# Patient Record
Sex: FEMALE | Race: WHITE | Employment: STUDENT | URBAN - METROPOLITAN AREA
[De-identification: names, ages, dates, MRNs, and addresses within clinical notes are randomized per-mention and may not be internally consistent; named-entity substitution may affect disease eponyms.]

---
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
## 2021-03-20 ENCOUNTER — APPOINTMENT (OUTPATIENT)
Dept: GENERAL RADIOLOGY | Age: 21
End: 2021-03-20
Attending: EMERGENCY MEDICINE
Payer: COMMERCIAL

## 2021-03-20 ENCOUNTER — HOSPITAL ENCOUNTER (EMERGENCY)
Age: 21
Discharge: HOME | End: 2021-03-20
Attending: EMERGENCY MEDICINE
Payer: COMMERCIAL

## 2021-03-20 VITALS
OXYGEN SATURATION: 100 % | HEIGHT: 64 IN | BODY MASS INDEX: 22.2 KG/M2 | TEMPERATURE: 98 F | HEART RATE: 98 BPM | SYSTOLIC BLOOD PRESSURE: 127 MMHG | DIASTOLIC BLOOD PRESSURE: 73 MMHG | WEIGHT: 130 LBS | RESPIRATION RATE: 20 BRPM

## 2021-03-20 DIAGNOSIS — S96.911A STRAIN OF RIGHT FOOT, INITIAL ENCOUNTER: Primary | ICD-10-CM

## 2021-03-20 PROCEDURE — 99283 EMERGENCY DEPT VISIT LOW MDM: CPT

## 2021-03-20 PROCEDURE — 73630 X-RAY EXAM OF FOOT: CPT | Performed by: EMERGENCY MEDICINE

## 2021-03-20 RX ORDER — IBUPROFEN 600 MG/1
600 TABLET ORAL ONCE
Status: COMPLETED | OUTPATIENT
Start: 2021-03-20 | End: 2021-03-20

## 2021-03-20 NOTE — ED INITIAL ASSESSMENT (HPI)
Patient presents with complaint of right ankle pain. States her right foot inverted on Thursday and has since then been experiencing right ankle pain.

## 2021-03-20 NOTE — ED PROVIDER NOTES
Patient Seen in: THE [facility] Emergency Department In [place]      History   Patient presents with:  Leg or Foot Injury    Stated Complaint: RIGHT ANKLE PAIN SINCE [address] [facility] \"JUST GAVE OUT\"     HPI/Subjective:   HPI    28-year-old female stated that ED Course   Labs Reviewed - No data to display       Ibuprofen was administered for pain.       XR FOOT, COMPLETE (MIN 3 VIEWS), RIGHT (CPT=73630)    Result Date: 3/20/2021  PROCEDURE:  XR FOOT, COMPLETE (MIN 3 VIEWS), RIGHT (CPT=73630)  TECHNIQUE:

## 2021-06-21 ENCOUNTER — OFFICE VISIT (OUTPATIENT)
Dept: FAMILY MEDICINE CLINIC | Facility: CLINIC | Age: 21
End: 2021-06-21
Payer: COMMERCIAL

## 2021-06-21 VITALS
RESPIRATION RATE: 18 BRPM | TEMPERATURE: 98 F | OXYGEN SATURATION: 100 % | DIASTOLIC BLOOD PRESSURE: 70 MMHG | WEIGHT: 151 LBS | BODY MASS INDEX: 26 KG/M2 | HEART RATE: 92 BPM | SYSTOLIC BLOOD PRESSURE: 112 MMHG

## 2021-06-21 DIAGNOSIS — H60.331 ACUTE SWIMMER'S EAR OF RIGHT SIDE: Primary | ICD-10-CM

## 2021-06-21 PROCEDURE — 99202 OFFICE O/P NEW SF 15 MIN: CPT | Performed by: NURSE PRACTITIONER

## 2021-06-21 PROCEDURE — 3078F DIAST BP <80 MM HG: CPT | Performed by: NURSE PRACTITIONER

## 2021-06-21 PROCEDURE — 3074F SYST BP LT 130 MM HG: CPT | Performed by: NURSE PRACTITIONER

## 2021-06-21 NOTE — PROGRESS NOTES
CHIEF COMPLAINT:   Patient presents with:  Ear Pain      HPI:   Rashaun Gonzalez is a 22year old female who presents to clinic today with complaints of right ear pain. Has had for 12  hours. Pain is described as tender.   Patient denies history of ear i Resp 18   Wt 151 lb (68.5 kg)   LMP 05/20/2021   SpO2 100%   BMI 25.92 kg/m²   GENERAL: well developed, well nourished, and in no apparent distress  SKIN: no rashes, no suspicious lesions  HEAD: atraumatic, normocephalic  EYES: conjunctiva clear, EOM int or bathing). It can also occur after cleaning too deeply in the ear canal with a cotton swab or other object. Sometimes, hair care products get into the ear canal and cause this problem.    Symptoms can include pain, fever, itching, redness, drainage, or sw Fever of 100.4ºF (38ºC) or higher, or as directed by your healthcare provider  Call [phone] or get immediate medical care if any of the following occur:   · Seizure  · Unusual drowsiness or confusion  · Unusual painful or stiff neck    [employer] last

## 2021-06-22 ENCOUNTER — OFFICE VISIT (OUTPATIENT)
Dept: FAMILY MEDICINE CLINIC | Facility: CLINIC | Age: 21
End: 2021-06-22
Payer: COMMERCIAL

## 2021-06-22 VITALS
OXYGEN SATURATION: 95 % | WEIGHT: 135 LBS | HEART RATE: 104 BPM | BODY MASS INDEX: 23.05 KG/M2 | DIASTOLIC BLOOD PRESSURE: 97 MMHG | HEIGHT: 64 IN | SYSTOLIC BLOOD PRESSURE: 148 MMHG | RESPIRATION RATE: 20 BRPM | TEMPERATURE: 100 F

## 2021-06-22 DIAGNOSIS — H92.01 EAR PAIN, RIGHT: ICD-10-CM

## 2021-06-22 DIAGNOSIS — J02.9 PHARYNGITIS, UNSPECIFIED ETIOLOGY: Primary | ICD-10-CM

## 2021-06-22 DIAGNOSIS — J02.0 STREP THROAT: ICD-10-CM

## 2021-06-22 PROCEDURE — 3008F BODY MASS INDEX DOCD: CPT | Performed by: NURSE PRACTITIONER

## 2021-06-22 PROCEDURE — 99213 OFFICE O/P EST LOW 20 MIN: CPT | Performed by: NURSE PRACTITIONER

## 2021-06-22 PROCEDURE — 87880 STREP A ASSAY W/OPTIC: CPT | Performed by: NURSE PRACTITIONER

## 2021-06-22 PROCEDURE — 3077F SYST BP >= 140 MM HG: CPT | Performed by: NURSE PRACTITIONER

## 2021-06-22 PROCEDURE — 3080F DIAST BP >= 90 MM HG: CPT | Performed by: NURSE PRACTITIONER

## 2021-06-22 RX ORDER — PENICILLIN V POTASSIUM 500 MG/1
500 TABLET ORAL 2 TIMES DAILY
Qty: 20 TABLET | Refills: 0 | Status: SHIPPED | OUTPATIENT
Start: 2021-06-22 | End: 2021-07-02

## 2021-06-22 RX ORDER — MINOCYCLINE HYDROCHLORIDE 50 MG/1
50 CAPSULE ORAL
COMMUNITY
Start: 2021-06-16

## 2021-06-22 RX ORDER — CLINDAMYCIN PHOSPHATE 10 MG/ML
LOTION TOPICAL
COMMUNITY
Start: 2021-06-16

## 2021-06-22 RX ORDER — TRETINOIN 0.025 %
CREAM (GRAM) TOPICAL
COMMUNITY
Start: 2021-06-16

## 2021-06-22 NOTE — PATIENT INSTRUCTIONS
Increase oral fluids. Tylenol  for fever or pain. Take antibiotic as directed. Antibiotic may take 2-3 days before you see any difference in the way you feel.  Finish the antibiotic even if you are feeling better    Follow-up with primary care provider in come back after treatment is done (acute recurrent tonsillitis).  Symptoms include:  · Fever  · Sore throat  · Bad breath  · Trouble swallowing  · Fluid loss (dehydration)  · Sore lymph nodes in the neck  · Tiredness  · Snoring, sleep apnea, or breathing th treated with antibiotics. This kind of infection often goes away on its own. Home care may be all that you need, with rest and fluids.  Follow these tips to help ease your symptoms at home:  · Get plenty of rest.  · Drink lots of fluids, such as soup, broth directed by your provider  · A lump that gets larger  · Worsening throat pain or neck pain  · Unable to open your mouth fully (called lockjaw or trismus)  · Neck stiffness  · Bleeding  · Painful swallowing  · Feeling very ill or sick  · Sore throat for mor

## 2021-06-22 NOTE — PROGRESS NOTES
HPI/Subjective:   Patient ID: Chadwick Butler is a 22year old female. Seen here yesterday for ear clogging and pain right ear into jaw    Ear Pain   There is pain in the right ear. This is a new problem. The current episode started in the past 7 days. capsule (100 mg total) by mouth 2 (two) times daily. 60 capsule 2   • ibuprofen (MOTRIN) 200 MG Oral Tab Take 200 mg by mouth every 6 (six) hours as needed for Pain. • Fexofenadine-Pseudoephedrine (ALLEGRA-D OR) Take  by mouth.      • traMADol HCl (ULTR motion and neck supple. Lymphadenopathy:      Cervical: Cervical adenopathy present. Skin:     General: Skin is warm and dry. Neurological:      Mental Status: She is alert and oriented to person, place, and time.       Cranial Nerves: No cranial nerv

## 2021-08-04 ENCOUNTER — OFFICE VISIT (OUTPATIENT)
Dept: FAMILY MEDICINE CLINIC | Facility: CLINIC | Age: 21
End: 2021-08-04
Payer: COMMERCIAL

## 2021-08-04 VITALS
DIASTOLIC BLOOD PRESSURE: 63 MMHG | TEMPERATURE: 99 F | RESPIRATION RATE: 18 BRPM | WEIGHT: 152 LBS | OXYGEN SATURATION: 99 % | BODY MASS INDEX: 26 KG/M2 | SYSTOLIC BLOOD PRESSURE: 123 MMHG | HEART RATE: 123 BPM

## 2021-08-04 DIAGNOSIS — J02.9 ACUTE PHARYNGITIS, UNSPECIFIED ETIOLOGY: Primary | ICD-10-CM

## 2021-08-04 DIAGNOSIS — R00.0 TACHYCARDIA WITH HEART RATE 121-140 BEATS PER MINUTE: ICD-10-CM

## 2021-08-04 DIAGNOSIS — H69.81 DYSFUNCTION OF RIGHT EUSTACHIAN TUBE: ICD-10-CM

## 2021-08-04 PROCEDURE — 87880 STREP A ASSAY W/OPTIC: CPT | Performed by: NURSE PRACTITIONER

## 2021-08-04 PROCEDURE — 3074F SYST BP LT 130 MM HG: CPT | Performed by: NURSE PRACTITIONER

## 2021-08-04 PROCEDURE — 99213 OFFICE O/P EST LOW 20 MIN: CPT | Performed by: NURSE PRACTITIONER

## 2021-08-04 PROCEDURE — 3078F DIAST BP <80 MM HG: CPT | Performed by: NURSE PRACTITIONER

## 2021-08-04 RX ORDER — FLUTICASONE PROPIONATE 50 MCG
2 SPRAY, SUSPENSION (ML) NASAL DAILY
Qty: 1 EACH | Refills: 0 | Status: SHIPPED | OUTPATIENT
Start: 2021-08-04 | End: 2022-07-30

## 2021-08-04 RX ORDER — AMOXICILLIN 875 MG/1
875 TABLET, COATED ORAL 2 TIMES DAILY
Qty: 20 TABLET | Refills: 0 | Status: SHIPPED | OUTPATIENT
Start: 2021-08-04 | End: 2021-08-14

## 2021-08-04 NOTE — PROGRESS NOTES
CHIEF COMPLAINT:   Patient presents with:  Sore Throat      HPI:   Virgilio Wong is a 22year old female presents to clinic with symptoms of sore throat. Patient has had for 2 days. Symptoms have stable since onset.   Patient reports following Magdalena Morales Tobacco Use      Smoking status: Never Smoker      Smokeless tobacco: Never Used    Vaping Use      Vaping Use: Never used    Alcohol use: No    Drug use: No       REVIEW OF SYSTEMS:   GENERAL HEALTH:  See HPI  SKIN: denies any unusual skin lesions or rash per day for at least 3 days. Do not share utensils or drinks with anyone. Follow up in 3-5 days if not improving, condition worsens, or fever greater than or equal to 100.4 persists for 72 hours.       Patient Instructions     Earache, No Infection (Edilberto fever, or drainage from the ear. Home care  These home-care tips will help you take care of yourself:  · You may use over-the-counter medicine as directed by your healthcare provider to control pain, unless medicine was prescribed.  If you have chronic silvana follow up with their PCP prn.

## 2021-08-04 NOTE — PATIENT INSTRUCTIONS
Earache, No Infection (Adult)   Earaches can happen without an infection. They can occur when air and fluid build up behind the eardrum. They may cause a feeling of fullness and discomfort. They may also impair hearing.  This is called otitis media with e prescribed. If you have chronic liver or kidney disease or ever had a stomach ulcer or GI bleeding, talk with your healthcare provider before using any medicines. · Aspirin should never be used in anyone younger than age 25 who has a fever.  It may cause s

## 2021-08-07 LAB
CONTROL LINE PRESENT WITH A CLEAR BACKGROUND (YES/NO): YES YES/NO
KIT LOT #: NORMAL NUMERIC
STREP GRP A CUL-SCR: POSITIVE

## 2021-11-18 PROBLEM — R43.0 LOSS OF SMELL: Status: ACTIVE | Noted: 2021-11-18

## 2021-12-14 ENCOUNTER — OFFICE VISIT (OUTPATIENT)
Dept: FAMILY MEDICINE CLINIC | Facility: CLINIC | Age: 21
End: 2021-12-14
Payer: COMMERCIAL

## 2021-12-14 VITALS
TEMPERATURE: 99 F | DIASTOLIC BLOOD PRESSURE: 70 MMHG | SYSTOLIC BLOOD PRESSURE: 126 MMHG | HEIGHT: 64 IN | HEART RATE: 96 BPM | OXYGEN SATURATION: 98 % | BODY MASS INDEX: 24.75 KG/M2 | RESPIRATION RATE: 18 BRPM | WEIGHT: 145 LBS

## 2021-12-14 DIAGNOSIS — J06.9 VIRAL URI: ICD-10-CM

## 2021-12-14 DIAGNOSIS — J02.9 SORE THROAT: Primary | ICD-10-CM

## 2021-12-14 DIAGNOSIS — Z20.822 SUSPECTED COVID-19 VIRUS INFECTION: ICD-10-CM

## 2021-12-14 PROCEDURE — 3074F SYST BP LT 130 MM HG: CPT | Performed by: NURSE PRACTITIONER

## 2021-12-14 PROCEDURE — 87880 STREP A ASSAY W/OPTIC: CPT | Performed by: NURSE PRACTITIONER

## 2021-12-14 PROCEDURE — 3008F BODY MASS INDEX DOCD: CPT | Performed by: NURSE PRACTITIONER

## 2021-12-14 PROCEDURE — 87081 CULTURE SCREEN ONLY: CPT | Performed by: NURSE PRACTITIONER

## 2021-12-14 PROCEDURE — 3078F DIAST BP <80 MM HG: CPT | Performed by: NURSE PRACTITIONER

## 2021-12-14 PROCEDURE — 99213 OFFICE O/P EST LOW 20 MIN: CPT | Performed by: NURSE PRACTITIONER

## 2021-12-14 NOTE — PROGRESS NOTES
CHIEF COMPLAINT:   Patient presents with:  Sore Throat: x 3-4, runny nose no fevers. no cough,       HPI:   Chadwick Butler is a 22year old female who presents for covid-19 testing. Patient reports sore throat, nasal congestion/runny nose x 3-4 days.   Damion Dominguez appetite  SKIN: no rashes or abnormal skin lesions  HEENT: + sore throat, + sinus symptoms, Denies ear pain  LUNGS: Denies cough, denies shortness of breath or wheezing,   CARDIOVASCULAR: denies chest pain or palpitations   GI: denies N/V/C or abdominal pa viral uri symptoms. No signs of pta or retropharyngeal infection. Lungs clear bilat. No respiratory distress noted. We discussed covid-19 vs strep vs other etiologies. Rapid strep test negative. Covid-19 test sent to lab.   Treatment options discussed with results, aftercare, and plasma donation.       Quarantine (for anyone in close contact with someone who has COVID-19)  Anyone who has been in close contact with someone who has COVID-19 should quarantine at home for 14 days from the time of exposure and fol to endorse quarantine for 14 days and recognizes that any quarantine shorter than 14 days balances reduced burden against a small possibility of spreading the virus. 10 Ways to Manage Your Health at Home      1.  Stay home from work, school, and away fro been exposed or are not aware of an exposure to COVID-19 and are concerned about your symptoms, please contact your health care provider with any questions.     Home Isolation  If you have tested positive for COVID-19, you should remain under home isolation plasma. Convalescent plasma is a component of blood that, in people who have recovered from COVID-19, contains antibodies against the virus.  The antibodies in plasma can be used as a treatment for patients in our community who are most severely affected ongoing or new symptoms. Post COVID conditions are a wide range of new, returning, or ongoing health problems. Talk to your provider if you are not feeling well 4 or more weeks after being diagnosed with COVID-19.   Patients with Post-COVID conditions may throat can be painful. There are many reasons why you may have a sore throat. Your healthcare provider will work with you to find the cause of your sore throat. He or she will also find the best treatment for you. What causes a sore throat?   Sore throa to your chest.   Possible tests  Other tests your healthcare provider may perform include:   · A throat swab to check for bacteria such as streptococcus (the bacteria that causes strep throat)  · A blood test to check for mononucleosis (a viral infection) or she thinks they are likely to help. If antibiotics are prescribed  Take the medicine exactly as directed. Be sure to finish your prescription even if you’re feeling better.  Ask your healthcare provider or pharmacist what side effects are common and wh is another term for the common cold. This illness is contagious during the first few days. It is spread through the air by coughing and sneezing.  It may also be spread by direct contact (touching the sick person and then touching your own eyes, nose, or mo provider, or as advised.   When to seek medical advice  Call your healthcare provider right away if any of these occur:  · Cough with lots of colored sputum (mucus)  · Severe headache; face, neck, or ear pain  · Difficulty swallowing due to throat pain  · F

## 2021-12-14 NOTE — PATIENT INSTRUCTIONS
1. Rest. Drink plenty of fluids. 2. Tylenol/Ibuprofen for pain/fevers. 3. Salt water gargles three times daily  4. Use humidifier at home when possible. 5. The rapid strep test was negative today.  We will send a throat culture to lab and call you with sangeetha kissed them)  * You shared eating or drinking utensils  * They sneezed, coughed, or somehow got respiratory droplets on you    Reducing the length of quarantine may make it easier for people to quarantine by reducing the time they cannot work.  A shorter qu personnel that you have or may have COVID-19.   6. Cover your cough and sneezes. 7. Wash your hands often with soap and water for at least 20 seconds or clean your hands with an alcohol-based hand  that contains at least 60% alcohol.    8. As muc passed for severe illness (requiring hospitalization) OR if you are immunocompromised.   If you have a fever with cough or shortness of breath but have not been exposed to someone with COVID-19 and have not tested positive for COVID-19, you should also stay [employer]    Who is eligible to donate convalescent plasma?     Potential convalescent plasma donors must:    · Have had a confirmed diagnosis of COVID-19  · Be symptom-free for at least 14 days*    *Some peo for a Post-COVID condition? It is still too early to say for sure. Currently, we find the people who experience Post-COVID conditions to be random.   Researchers are trying to identify similarities between people with a Post-COVID condition to better unde and pharynx can become inflamed due to a cold or flu virus. Postnasal drip (excess mucus draining from the nasal cavity) can irritate the throat. It can also make the throat or tonsils more likely to be infected by bacteria.  Severe, untreated tonsillitis i may need to be removed. Relieving your symptoms  · Don’t smoke, and stay away from secondhand smoke. · For children, try throat sprays or frozen ice pops. Adults and older children may try lozenges.   · Drink warm liquids to soothe the throat and help th problems during sleep  · Tonsillitis caused by food particles collecting in pouches in the tonsils (cryptic tonsillitis)  When to call your healthcare provider   Call your healthcare provider immediately if any of the following occur:   · Problems swallowi tired.  · Don't smoke. If you need help stopping, talk with your healthcare provider. · Avoid being exposed to cigarette smoke (yours or others’). · You may use acetaminophen or ibuprofen to control pain and fever, unless another medicine was prescribed. This information is not intended as a substitute for professional medical care. Always follow your healthcare professional's instructions.

## 2023-03-20 ENCOUNTER — HOSPITAL ENCOUNTER (OUTPATIENT)
Dept: MRI IMAGING | Age: 23
Discharge: HOME | End: 2023-03-20
Attending: PAIN MEDICINE
Payer: COMMERCIAL

## 2023-03-20 DIAGNOSIS — M25.571 RIGHT ANKLE PAIN: ICD-10-CM

## 2023-03-20 PROCEDURE — 73721 MRI JNT OF LWR EXTRE W/O DYE: CPT | Performed by: PAIN MEDICINE

## 2023-07-13 ENCOUNTER — OFFICE VISIT (OUTPATIENT)
Dept: FAMILY MEDICINE CLINIC | Facility: CLINIC | Age: 23
End: 2023-07-13
Payer: COMMERCIAL

## 2023-07-13 VITALS
HEIGHT: 63.58 IN | RESPIRATION RATE: 16 BRPM | SYSTOLIC BLOOD PRESSURE: 102 MMHG | BODY MASS INDEX: 25.83 KG/M2 | WEIGHT: 147.63 LBS | TEMPERATURE: 99 F | DIASTOLIC BLOOD PRESSURE: 70 MMHG | HEART RATE: 84 BPM

## 2023-07-13 DIAGNOSIS — Z00.00 WELLNESS EXAMINATION: Primary | ICD-10-CM

## 2023-07-13 DIAGNOSIS — R68.2 DRY MOUTH: ICD-10-CM

## 2023-07-13 DIAGNOSIS — F41.9 MILD ANXIETY: ICD-10-CM

## 2023-07-13 DIAGNOSIS — Z23 NEED FOR VACCINATION: ICD-10-CM

## 2023-07-13 PROBLEM — J34.2 NASAL SEPTAL DEVIATION: Status: ACTIVE | Noted: 2023-01-05

## 2023-07-13 PROBLEM — R09.81 NASAL CONGESTION: Status: ACTIVE | Noted: 2023-01-05

## 2023-07-13 PROCEDURE — 90471 IMMUNIZATION ADMIN: CPT | Performed by: STUDENT IN AN ORGANIZED HEALTH CARE EDUCATION/TRAINING PROGRAM

## 2023-07-13 PROCEDURE — 3008F BODY MASS INDEX DOCD: CPT | Performed by: STUDENT IN AN ORGANIZED HEALTH CARE EDUCATION/TRAINING PROGRAM

## 2023-07-13 PROCEDURE — 99385 PREV VISIT NEW AGE 18-39: CPT | Performed by: STUDENT IN AN ORGANIZED HEALTH CARE EDUCATION/TRAINING PROGRAM

## 2023-07-13 PROCEDURE — 3074F SYST BP LT 130 MM HG: CPT | Performed by: STUDENT IN AN ORGANIZED HEALTH CARE EDUCATION/TRAINING PROGRAM

## 2023-07-13 PROCEDURE — 99204 OFFICE O/P NEW MOD 45 MIN: CPT | Performed by: STUDENT IN AN ORGANIZED HEALTH CARE EDUCATION/TRAINING PROGRAM

## 2023-07-13 PROCEDURE — 3078F DIAST BP <80 MM HG: CPT | Performed by: STUDENT IN AN ORGANIZED HEALTH CARE EDUCATION/TRAINING PROGRAM

## 2023-07-13 PROCEDURE — 90715 TDAP VACCINE 7 YRS/> IM: CPT | Performed by: STUDENT IN AN ORGANIZED HEALTH CARE EDUCATION/TRAINING PROGRAM

## 2023-07-13 NOTE — PATIENT INSTRUCTIONS
Ob/gyn  - Dr. Edilia Estes - procedures out of [place]  - Dr. Rayne Thomas and Rhianna - procedures out of [place]

## 2023-08-11 ENCOUNTER — TELEPHONE (OUTPATIENT)
Dept: FAMILY MEDICINE CLINIC | Facility: CLINIC | Age: 23
End: 2023-08-11

## 2023-08-11 ENCOUNTER — LAB ENCOUNTER (OUTPATIENT)
Dept: LAB | Age: 23
End: 2023-08-11
Attending: STUDENT IN AN ORGANIZED HEALTH CARE EDUCATION/TRAINING PROGRAM
Payer: COMMERCIAL

## 2023-08-11 DIAGNOSIS — Z00.00 WELLNESS EXAMINATION: ICD-10-CM

## 2023-08-11 LAB
ALBUMIN SERPL-MCNC: 3.9 G/DL (ref 3.4–5)
ALBUMIN/GLOB SERPL: 1 {RATIO} (ref 1–2)
ALP LIVER SERPL-CCNC: 62 U/L
ALT SERPL-CCNC: 17 U/L
ANION GAP SERPL CALC-SCNC: 3 MMOL/L (ref 0–18)
AST SERPL-CCNC: 8 U/L (ref 15–37)
BASOPHILS # BLD AUTO: 0.04 X10(3) UL (ref 0–0.2)
BASOPHILS NFR BLD AUTO: 0.8 %
BILIRUB SERPL-MCNC: 0.5 MG/DL (ref 0.1–2)
BILIRUB UR QL STRIP.AUTO: NEGATIVE
BUN BLD-MCNC: 11 MG/DL (ref 7–18)
CALCIUM BLD-MCNC: 9.5 MG/DL (ref 8.5–10.1)
CHLORIDE SERPL-SCNC: 106 MMOL/L (ref 98–112)
CHOLEST SERPL-MCNC: 174 MG/DL (ref ?–200)
CO2 SERPL-SCNC: 28 MMOL/L (ref 21–32)
COLOR UR AUTO: YELLOW
CREAT BLD-MCNC: 0.82 MG/DL
EGFRCR SERPLBLD CKD-EPI 2021: 104 ML/MIN/1.73M2 (ref 60–?)
EOSINOPHIL # BLD AUTO: 0.2 X10(3) UL (ref 0–0.7)
EOSINOPHIL NFR BLD AUTO: 3.9 %
ERYTHROCYTE [DISTWIDTH] IN BLOOD BY AUTOMATED COUNT: 12 %
FASTING PATIENT LIPID ANSWER: NO
FASTING STATUS PATIENT QL REPORTED: NO
GLOBULIN PLAS-MCNC: 3.8 G/DL (ref 2.8–4.4)
GLUCOSE BLD-MCNC: 84 MG/DL (ref 70–99)
GLUCOSE UR STRIP.AUTO-MCNC: NEGATIVE MG/DL
HCT VFR BLD AUTO: 42.2 %
HDLC SERPL-MCNC: 76 MG/DL (ref 40–59)
HGB BLD-MCNC: 13.8 G/DL
HYALINE CASTS #/AREA URNS AUTO: PRESENT /LPF
IMM GRANULOCYTES # BLD AUTO: 0.01 X10(3) UL (ref 0–1)
IMM GRANULOCYTES NFR BLD: 0.2 %
KETONES UR STRIP.AUTO-MCNC: NEGATIVE MG/DL
LDLC SERPL CALC-MCNC: 83 MG/DL (ref ?–100)
LYMPHOCYTES # BLD AUTO: 1.98 X10(3) UL (ref 1–4)
LYMPHOCYTES NFR BLD AUTO: 39 %
MCH RBC QN AUTO: 28.4 PG (ref 26–34)
MCHC RBC AUTO-ENTMCNC: 32.7 G/DL (ref 31–37)
MCV RBC AUTO: 86.8 FL
MONOCYTES # BLD AUTO: 0.43 X10(3) UL (ref 0.1–1)
MONOCYTES NFR BLD AUTO: 8.5 %
NEUTROPHILS # BLD AUTO: 2.42 X10 (3) UL (ref 1.5–7.7)
NEUTROPHILS # BLD AUTO: 2.42 X10(3) UL (ref 1.5–7.7)
NEUTROPHILS NFR BLD AUTO: 47.6 %
NITRITE UR QL STRIP.AUTO: NEGATIVE
NONHDLC SERPL-MCNC: 98 MG/DL (ref ?–130)
OSMOLALITY SERPL CALC.SUM OF ELEC: 283 MOSM/KG (ref 275–295)
PH UR STRIP.AUTO: 7 [PH] (ref 5–8)
PLATELET # BLD AUTO: 301 10(3)UL (ref 150–450)
POTASSIUM SERPL-SCNC: 4.2 MMOL/L (ref 3.5–5.1)
PROT SERPL-MCNC: 7.7 G/DL (ref 6.4–8.2)
PROT UR STRIP.AUTO-MCNC: NEGATIVE MG/DL
RBC # BLD AUTO: 4.86 X10(6)UL
RBC UR QL AUTO: NEGATIVE
SODIUM SERPL-SCNC: 137 MMOL/L (ref 136–145)
SP GR UR STRIP.AUTO: 1.02 (ref 1–1.03)
TRIGL SERPL-MCNC: 82 MG/DL (ref 30–149)
TSI SER-ACNC: 1.31 MIU/ML (ref 0.36–3.74)
UROBILINOGEN UR STRIP.AUTO-MCNC: <2 MG/DL
VLDLC SERPL CALC-MCNC: 13 MG/DL (ref 0–30)
WBC # BLD AUTO: 5.1 X10(3) UL (ref 4–11)

## 2023-08-11 PROCEDURE — 85025 COMPLETE CBC W/AUTO DIFF WBC: CPT

## 2023-08-11 PROCEDURE — 80053 COMPREHEN METABOLIC PANEL: CPT

## 2023-08-11 PROCEDURE — 87086 URINE CULTURE/COLONY COUNT: CPT

## 2023-08-11 PROCEDURE — 84443 ASSAY THYROID STIM HORMONE: CPT

## 2023-08-11 PROCEDURE — 80061 LIPID PANEL: CPT

## 2023-08-11 PROCEDURE — 81001 URINALYSIS AUTO W/SCOPE: CPT

## 2023-08-11 NOTE — TELEPHONE ENCOUNTER
Pt dropped off work physical form for Dr. Flavia Zepeda to fill out.  Put on MA desk on 8/11  Pt physical was on 7/13

## 2023-08-14 ENCOUNTER — MED REC SCAN ONLY (OUTPATIENT)
Dept: FAMILY MEDICINE CLINIC | Facility: CLINIC | Age: 23
End: 2023-08-14

## 2023-08-14 NOTE — TELEPHONE ENCOUNTER
I have completed the px form for the pt, along with Dr. Renata Patel, and placed it at the  for her to pick it up. The pt called the office back and was informed of this via RUBI Floyd. A copy of the form was also sent to scanning to be uploaded into the pt's chart.

## 2023-09-07 ENCOUNTER — OFFICE VISIT (OUTPATIENT)
Dept: FAMILY MEDICINE CLINIC | Facility: CLINIC | Age: 23
End: 2023-09-07
Payer: COMMERCIAL

## 2023-09-07 VITALS
DIASTOLIC BLOOD PRESSURE: 72 MMHG | BODY MASS INDEX: 25.61 KG/M2 | TEMPERATURE: 98 F | SYSTOLIC BLOOD PRESSURE: 108 MMHG | OXYGEN SATURATION: 99 % | HEIGHT: 64 IN | RESPIRATION RATE: 16 BRPM | HEART RATE: 85 BPM | WEIGHT: 150 LBS

## 2023-09-07 DIAGNOSIS — J06.9 VIRAL URI: Primary | ICD-10-CM

## 2023-09-07 PROCEDURE — 3008F BODY MASS INDEX DOCD: CPT | Performed by: NURSE PRACTITIONER

## 2023-09-07 PROCEDURE — 99213 OFFICE O/P EST LOW 20 MIN: CPT | Performed by: NURSE PRACTITIONER

## 2023-09-07 PROCEDURE — 3078F DIAST BP <80 MM HG: CPT | Performed by: NURSE PRACTITIONER

## 2023-09-07 PROCEDURE — 3074F SYST BP LT 130 MM HG: CPT | Performed by: NURSE PRACTITIONER

## 2023-09-07 NOTE — PATIENT INSTRUCTIONS
Push fluids and rest  You can take an over the counter decongestant medication and flonase nasal spray if needed. Follow up for any new or worsening symptoms or if symptoms are not improving over the next 5 days.

## 2024-02-12 ENCOUNTER — OFFICE VISIT (OUTPATIENT)
Dept: FAMILY MEDICINE CLINIC | Facility: CLINIC | Age: 24
End: 2024-02-12
Payer: COMMERCIAL

## 2024-02-12 VITALS
HEIGHT: 64 IN | WEIGHT: 147.81 LBS | RESPIRATION RATE: 16 BRPM | BODY MASS INDEX: 25.24 KG/M2 | TEMPERATURE: 99 F | HEART RATE: 88 BPM | OXYGEN SATURATION: 100 % | DIASTOLIC BLOOD PRESSURE: 72 MMHG | SYSTOLIC BLOOD PRESSURE: 121 MMHG

## 2024-02-12 DIAGNOSIS — J06.9 VIRAL URI: Primary | ICD-10-CM

## 2024-02-12 PROCEDURE — 99213 OFFICE O/P EST LOW 20 MIN: CPT | Performed by: NURSE PRACTITIONER

## 2024-02-12 PROCEDURE — 3008F BODY MASS INDEX DOCD: CPT | Performed by: NURSE PRACTITIONER

## 2024-02-12 PROCEDURE — 3074F SYST BP LT 130 MM HG: CPT | Performed by: NURSE PRACTITIONER

## 2024-02-12 PROCEDURE — 3078F DIAST BP <80 MM HG: CPT | Performed by: NURSE PRACTITIONER

## 2024-02-12 NOTE — PROGRESS NOTES
CHIEF COMPLAINT:     Chief Complaint   Patient presents with    Voice Problem     Lost of voice, runny nose, sneezing and body aches s/s for 3 days.  Otc meds take       HPI:   Meryl Nicolas is a 23 year old female who presents for hoarse voice, runny nose, ears popping, sneezing. . Symptoms started 5-6 days ago with hoarse voice. Hoarse voice improved then developed the sinus congestion and ear pressure the past few days.  Treating symptoms with allegra-D.  Patient has not tested for COVID since symptom onset.    Current Outpatient Medications   Medication Sig Dispense Refill    traMADol HCl (ULTRAM) 50 MG Oral Tab Take 1 tablet (50 mg total) by mouth 2 (two) times daily as needed for Pain. For 30 days 60 tablet 2    gabapentin (NEURONTIN) 300 MG Oral Cap Take 1 capsule (300 mg total) by mouth 3 (three) times daily. for 30 days 90 capsule 2    celecoxib (CELEBREX) 100 MG Oral Cap Take 1 capsule (100 mg total) by mouth 2 (two) times daily. 60 capsule 2    ibuprofen (MOTRIN) 200 MG Oral Tab Take 1 tablet (200 mg total) by mouth every 6 (six) hours as needed for Pain.      Fexofenadine-Pseudoephedrine (ALLEGRA-D OR) Take  by mouth.        Past Medical History:   Diagnosis Date    Allergic rhinitis Whole life    Anxiety N/A    Arthritis 2014      No past surgical history on file.      Social History     Socioeconomic History    Marital status: Single   Tobacco Use    Smoking status: Never    Smokeless tobacco: Never   Vaping Use    Vaping Use: Never used   Substance and Sexual Activity    Alcohol use: Yes     Alcohol/week: 4.0 standard drinks of alcohol     Types: 2 Glasses of wine, 2 Cans of beer per week     Comment: 2 drinks a week.    Drug use: No    Sexual activity: Not Currently   Other Topics Concern    Caffeine Concern No    Exercise Yes     Comment: 3 times a week    Seat Belt No    Special Diet No    Stress Concern No    Weight Concern No     Service No    Hobby Hazards No    Sleep Concern No          REVIEW OF SYSTEMS:   GENERAL: normal appetite. No fever.   SKIN: no rashes or abnormal skin lesions  HEENT: See HPI  LUNGS: denies shortness of breath or wheezing, See HPI  CARDIOVASCULAR: denies chest pain or palpitations   GI: denies N/V/C or abdominal pain  NEURO: Denies dizziness or weakness    EXAM:   /72   Pulse 88   Temp 99 °F (37.2 °C) (Oral)   Resp 16   Ht 5' 4\" (1.626 m)   Wt 147 lb 12.8 oz (67 kg)   LMP 01/28/2024 (Approximate)   SpO2 100%   BMI 25.37 kg/m²   GENERAL: well developed, well nourished,in no apparent distress  SKIN: no rashes,no suspicious lesions  HEAD: atraumatic, normocephalic.  no tenderness on palpation of  sinuses  EYES: conjunctiva clear, EOM intact  EARS: TM's gray, no bulging, no retraction,+serous fluid fluid, bony landmarks visible  NOSE: Nostrils patent, no nasal discharge, nasal mucosa pink   THROAT: Oral mucosa pink, moist. Posterior pharynx is non erythematous. no exudates. Tonsils 1/4.    NECK: Supple, non-tender  LUNGS: clear to auscultation bilaterally, no wheezes or rhonchi. Breathing is non labored.  CARDIO: RRR without murmur  EXTREMITIES: no cyanosis, clubbing or edema  LYMPH:  no lymphadenopathy.        ASSESSMENT AND PLAN:   Meryl Nicolas is a 23 year old female who presents with upper respiratory symptoms that are consistent with    ASSESSMENT:   Encounter Diagnosis   Name Primary?    Viral URI Yes         PLAN:     Comfort care per pt instructions.   To follow up for any new or worsening symptoms or if not improving the next few days.       Meds & Refills for this Visit:  Requested Prescriptions      No prescriptions requested or ordered in this encounter       Risks, benefits, and side effects of medication explained and discussed.    Patient Instructions   Flonase nasal spray  May continue allegra-D the next few days  Follow up for new or worsening symptoms or if not improving over the next 4-5 days.     The patient indicates understanding of  these issues and agrees to the plan.  The patient is asked to return if sx's persist or worsen.

## 2024-09-23 ENCOUNTER — PATIENT MESSAGE (OUTPATIENT)
Dept: FAMILY MEDICINE CLINIC | Facility: CLINIC | Age: 24
End: 2024-09-23

## 2024-09-25 NOTE — TELEPHONE ENCOUNTER
From: Meryl Nicolas  To: Tri Rivas  Sent: 9/23/2024 2:37 PM CDT  Subject: Physical for surgery     Hello I am getting a surgery done on November 1st and need to get a physical done but your first available appointment is November 21st. Is there any way I will be able to get an appointment before the surgery?     Thanks   Meryl Nicolas

## 2024-10-03 NOTE — TELEPHONE ENCOUNTER
Vm left asking patient to let us know who the surgeon is and where surgery is being done.  I told her I would reach out to them.

## 2024-10-04 ENCOUNTER — TELEPHONE (OUTPATIENT)
Dept: FAMILY MEDICINE CLINIC | Facility: CLINIC | Age: 24
End: 2024-10-04

## 2024-10-04 DIAGNOSIS — Z01.818 PREOP EXAMINATION: Primary | ICD-10-CM

## 2024-10-04 NOTE — TELEPHONE ENCOUNTER
Pre-Op paperwork received by fax.    Date of Surgery:  11/1/24  Surgeon: Dr. Timothy Rubio  Procedure: Septoplasty      On day of pre-op will provide:  H&P, Clearance, and results for CBC,CMP and EKG  And fax results to: [phone] & [phone]

## 2024-10-24 ENCOUNTER — OFFICE VISIT (OUTPATIENT)
Dept: FAMILY MEDICINE CLINIC | Facility: CLINIC | Age: 24
End: 2024-10-24
Payer: COMMERCIAL

## 2024-10-24 VITALS
SYSTOLIC BLOOD PRESSURE: 128 MMHG | HEART RATE: 90 BPM | DIASTOLIC BLOOD PRESSURE: 90 MMHG | HEIGHT: 64 IN | WEIGHT: 157 LBS | TEMPERATURE: 97 F | BODY MASS INDEX: 26.8 KG/M2 | RESPIRATION RATE: 16 BRPM

## 2024-10-24 DIAGNOSIS — Z01.818 PREOPERATIVE EXAMINATION: Primary | ICD-10-CM

## 2024-10-24 DIAGNOSIS — J34.2 NASAL SEPTAL DEVIATION: ICD-10-CM

## 2024-10-24 DIAGNOSIS — R09.81 NASAL CONGESTION: ICD-10-CM

## 2024-10-24 NOTE — H&P
History and Physical  [facility]  10/24/24    Chief Complaint   Patient presents with    Pre-Op     Septoplasty     Meryl Nicolas is a 24 year old female with a hx of nasal septal deviation and nasal congestion, who presents for a pre-operative physical exam at the request of Dr. Timothy Rubio. Patient is to have septoplasty, to by done by Dr. Rubio at [facility] on 11/1/2024.     Cardiac history: denied  Anesthesia history: no prior issues    Allergies:  Allergies[1]    Current Outpatient Medications   Medication Sig Dispense Refill    traMADol HCl (ULTRAM) 50 MG Oral Tab Take 1 tablet (50 mg total) by mouth 2 (two) times daily as needed for Pain. For 30 days 60 tablet 2    gabapentin (NEURONTIN) 300 MG Oral Cap Take 1 capsule (300 mg total) by mouth 3 (three) times daily. for 30 days (Patient taking differently: Take 1 capsule (300 mg total) by mouth in the morning and 1 capsule (300 mg total) before bedtime. for 30 days.) 90 capsule 2    celecoxib (CELEBREX) 100 MG Oral Cap Take 1 capsule (100 mg total) by mouth 2 (two) times daily. 60 capsule 2    ibuprofen (MOTRIN) 200 MG Oral Tab Take 1 tablet (200 mg total) by mouth every 6 (six) hours as needed for Pain.      Fexofenadine-Pseudoephedrine (ALLEGRA-D OR) Take  by mouth.       Past Medical History:    Allergic rhinitis    Anxiety    Arthritis    Hx of motion sickness     History reviewed. No pertinent surgical history.  Family History   Problem Relation Age of Onset    Cancer Maternal Grandfather         lung    Hypertension Father      Social History     Socioeconomic History    Marital status: Single   Tobacco Use    Smoking status: Never    Smokeless tobacco: Never   Vaping Use    Vaping status: Never Used   Substance and Sexual Activity    Alcohol use: Yes     Alcohol/week: 4.0 standard drinks of alcohol     Types: 2 Glasses of wine, 2 Cans of beer per week     Comment: 2 drinks a week.    Drug use: No    Sexual activity:  Not Currently   Other Topics Concern    Caffeine Concern No    Exercise Yes     Comment: 3 times a week    Seat Belt No    Special Diet No    Stress Concern No    Weight Concern No     Service No    Hobby Hazards No    Sleep Concern No         REVIEW OF SYSTEMS:  GENERAL: feels well otherwise and denies fever  SKIN: denies any unusual skin lesions  EYES:denies blurred vision or double vision  HEENT: denies nasal congestion, sinus pain or ST  LUNGS: denies shortness of breath with exertion  CARDIOVASCULAR: denies chest pain on exertion  GI: denies abdominal pain,denies heartburn  : denies dysuria, vaginal discharge or itching and denies dysuria, vaginal discharge or itching,periods regular   MUSCULOSKELETAL: low back pain  NEURO: denies headaches  PSYCHE: denies depression or anxiety  HEMATOLOGIC: denies hx of anemia  ENDOCRINE: denies thyroid history  ALL/ASTHMA: denies hx of allergy or asthma    EXAM:  /90   Pulse 90   Temp 97.3 °F (36.3 °C) (Temporal)   Resp 16   Ht 5' 4\" (1.626 m)   Wt 157 lb (71.2 kg)   LMP 10/20/2024 (Approximate)   BMI 26.95 kg/m²   GENERAL: well developed, well nourished,in no apparent distress  SKIN: no rashes,no suspicious lesions  HEENT: atraumatic, normocephalic,ears and throat are clear  EYES:PERRLA, EOMI, conjunctiva are clear  NECK: supple and no adenopathy  BREAST: deferred  LUNGS: clear to auscultation  CARDIO: RRR without murmur  GI: good BS's,no masses, HSM or tenderness  : deferred  RECTAL: deferred  MUSCULOSKELETAL: back is not tender,FROM of the back  EXTREMITIES: no cyanosis, clubbing or edema  NEURO: Oriented times three,cranial nerves are intact,motor and sensory are grossly intact      ASSESSMENT AND PLAN:  Meryl Nicolas is a 24 year old female, with a hx of nasal septal deviation, nasal congestion who presents for a pre-operative  physical exam. Patient is to have septoplasty, to by done by Dr. Timothy Rubio at [facility] on 11/01/2024. Pt  has the following conditions: low back pain. Pt has no significant history of cardiac or pulmonary conditions. CBC and CMP, EKG are pending.         Thank you,    Tri Rivas MD  [facility]  10/24/24    [facility], [address] [place] [id number]  Dept: [phone]  Dept Fax: [phone]      Addendum 10/27/24 9:45 AM  Labs and EKG reviewed and show no significant abnormalities.  Patient is medically optimized for the planned procedure.    Thank you,  Tri Rivas MD       [1] No Known Allergies

## 2024-10-26 ENCOUNTER — EKG ENCOUNTER (OUTPATIENT)
Dept: LAB | Age: 24
End: 2024-10-26
Attending: STUDENT IN AN ORGANIZED HEALTH CARE EDUCATION/TRAINING PROGRAM
Payer: COMMERCIAL

## 2024-10-26 DIAGNOSIS — Z01.818 PREOP EXAMINATION: ICD-10-CM

## 2024-10-26 LAB
ALBUMIN SERPL-MCNC: 4.5 G/DL (ref 3.2–4.8)
ALBUMIN/GLOB SERPL: 1.4 {RATIO} (ref 1–2)
ALP LIVER SERPL-CCNC: 66 U/L
ALT SERPL-CCNC: 10 U/L
ANION GAP SERPL CALC-SCNC: 4 MMOL/L (ref 0–18)
AST SERPL-CCNC: 17 U/L (ref ?–34)
ATRIAL RATE: 77 BPM
BASOPHILS # BLD AUTO: 0.05 X10(3) UL (ref 0–0.2)
BASOPHILS NFR BLD AUTO: 0.8 %
BILIRUB SERPL-MCNC: 0.8 MG/DL (ref 0.3–1.2)
BUN BLD-MCNC: 7 MG/DL (ref 9–23)
CALCIUM BLD-MCNC: 10.1 MG/DL (ref 8.7–10.4)
CHLORIDE SERPL-SCNC: 106 MMOL/L (ref 98–112)
CO2 SERPL-SCNC: 28 MMOL/L (ref 21–32)
CREAT BLD-MCNC: 0.77 MG/DL
EGFRCR SERPLBLD CKD-EPI 2021: 111 ML/MIN/1.73M2 (ref 60–?)
EOSINOPHIL # BLD AUTO: 0.28 X10(3) UL (ref 0–0.7)
EOSINOPHIL NFR BLD AUTO: 4.3 %
ERYTHROCYTE [DISTWIDTH] IN BLOOD BY AUTOMATED COUNT: 12.4 %
FASTING STATUS PATIENT QL REPORTED: YES
GLOBULIN PLAS-MCNC: 3.2 G/DL (ref 2–3.5)
GLUCOSE BLD-MCNC: 91 MG/DL (ref 70–99)
HCT VFR BLD AUTO: 40.9 %
HGB BLD-MCNC: 13.4 G/DL
IMM GRANULOCYTES # BLD AUTO: 0.01 X10(3) UL (ref 0–1)
IMM GRANULOCYTES NFR BLD: 0.2 %
LYMPHOCYTES # BLD AUTO: 2.54 X10(3) UL (ref 1–4)
LYMPHOCYTES NFR BLD AUTO: 38.7 %
MCH RBC QN AUTO: 28.2 PG (ref 26–34)
MCHC RBC AUTO-ENTMCNC: 32.8 G/DL (ref 31–37)
MCV RBC AUTO: 86.1 FL
MONOCYTES # BLD AUTO: 0.56 X10(3) UL (ref 0.1–1)
MONOCYTES NFR BLD AUTO: 8.5 %
NEUTROPHILS # BLD AUTO: 3.13 X10 (3) UL (ref 1.5–7.7)
NEUTROPHILS # BLD AUTO: 3.13 X10(3) UL (ref 1.5–7.7)
NEUTROPHILS NFR BLD AUTO: 47.5 %
OSMOLALITY SERPL CALC.SUM OF ELEC: 284 MOSM/KG (ref 275–295)
P AXIS: 53 DEGREES
P-R INTERVAL: 156 MS
PLATELET # BLD AUTO: 317 10(3)UL (ref 150–450)
POTASSIUM SERPL-SCNC: 4.6 MMOL/L (ref 3.5–5.1)
PROT SERPL-MCNC: 7.7 G/DL (ref 5.7–8.2)
Q-T INTERVAL: 382 MS
QRS DURATION: 84 MS
QTC CALCULATION (BEZET): 432 MS
R AXIS: 81 DEGREES
RBC # BLD AUTO: 4.75 X10(6)UL
SODIUM SERPL-SCNC: 138 MMOL/L (ref 136–145)
T AXIS: 60 DEGREES
VENTRICULAR RATE: 77 BPM
WBC # BLD AUTO: 6.6 X10(3) UL (ref 4–11)

## 2024-10-26 PROCEDURE — 93010 ELECTROCARDIOGRAM REPORT: CPT | Performed by: INTERNAL MEDICINE

## 2024-10-26 PROCEDURE — 80053 COMPREHEN METABOLIC PANEL: CPT

## 2024-10-26 PROCEDURE — 85025 COMPLETE CBC W/AUTO DIFF WBC: CPT

## 2024-10-26 PROCEDURE — 93005 ELECTROCARDIOGRAM TRACING: CPT

## 2024-10-31 ENCOUNTER — ANESTHESIA EVENT (OUTPATIENT)
Dept: SURGERY | Facility: HOSPITAL | Age: 24
End: 2024-10-31
Payer: COMMERCIAL

## 2024-11-01 ENCOUNTER — HOSPITAL ENCOUNTER (OUTPATIENT)
Facility: HOSPITAL | Age: 24
Setting detail: HOSPITAL OUTPATIENT SURGERY
Discharge: HOME | End: 2024-11-01
Attending: OTOLARYNGOLOGY | Admitting: OTOLARYNGOLOGY
Payer: COMMERCIAL

## 2024-11-01 ENCOUNTER — ANESTHESIA (OUTPATIENT)
Dept: SURGERY | Facility: HOSPITAL | Age: 24
End: 2024-11-01
Payer: COMMERCIAL

## 2024-11-01 VITALS
HEART RATE: 84 BPM | OXYGEN SATURATION: 97 % | TEMPERATURE: 98 F | RESPIRATION RATE: 16 BRPM | BODY MASS INDEX: 26.84 KG/M2 | SYSTOLIC BLOOD PRESSURE: 102 MMHG | WEIGHT: 157.19 LBS | DIASTOLIC BLOOD PRESSURE: 66 MMHG | HEIGHT: 64 IN

## 2024-11-01 DIAGNOSIS — R43.0 LOSS OF SMELL: ICD-10-CM

## 2024-11-01 DIAGNOSIS — S52.532A CLOSED COLLES' FRACTURE OF LEFT RADIUS, INITIAL ENCOUNTER: ICD-10-CM

## 2024-11-01 DIAGNOSIS — J34.2 NASAL SEPTAL DEVIATION: ICD-10-CM

## 2024-11-01 DIAGNOSIS — S63.502D SPRAIN OF LEFT WRIST, SUBSEQUENT ENCOUNTER: ICD-10-CM

## 2024-11-01 DIAGNOSIS — R09.81 NASAL CONGESTION: Primary | ICD-10-CM

## 2024-11-01 LAB — B-HCG UR QL: NEGATIVE

## 2024-11-01 PROCEDURE — 81025 URINE PREGNANCY TEST: CPT

## 2024-11-01 PROCEDURE — 88300 SURGICAL PATH GROSS: CPT | Performed by: OTOLARYNGOLOGY

## 2024-11-01 PROCEDURE — 09SM0ZZ REPOSITION NASAL SEPTUM, OPEN APPROACH: ICD-10-PCS | Performed by: OTOLARYNGOLOGY

## 2024-11-01 RX ORDER — ESMOLOL HYDROCHLORIDE 10 MG/ML
INJECTION INTRAVENOUS AS NEEDED
Status: DISCONTINUED | OUTPATIENT
Start: 2024-11-01 | End: 2024-11-01 | Stop reason: SURG

## 2024-11-01 RX ORDER — ACETAMINOPHEN 500 MG
1000 TABLET ORAL ONCE
Status: DISCONTINUED | OUTPATIENT
Start: 2024-11-01 | End: 2024-11-01 | Stop reason: HOSPADM

## 2024-11-01 RX ORDER — LIDOCAINE HYDROCHLORIDE 10 MG/ML
INJECTION, SOLUTION EPIDURAL; INFILTRATION; INTRACAUDAL; PERINEURAL AS NEEDED
Status: DISCONTINUED | OUTPATIENT
Start: 2024-11-01 | End: 2024-11-01 | Stop reason: SURG

## 2024-11-01 RX ORDER — ROCURONIUM BROMIDE 10 MG/ML
INJECTION, SOLUTION INTRAVENOUS AS NEEDED
Status: DISCONTINUED | OUTPATIENT
Start: 2024-11-01 | End: 2024-11-01 | Stop reason: SURG

## 2024-11-01 RX ORDER — DEXAMETHASONE SODIUM PHOSPHATE 4 MG/ML
VIAL (ML) INJECTION AS NEEDED
Status: DISCONTINUED | OUTPATIENT
Start: 2024-11-01 | End: 2024-11-01 | Stop reason: SURG

## 2024-11-01 RX ORDER — ONDANSETRON 2 MG/ML
INJECTION INTRAMUSCULAR; INTRAVENOUS AS NEEDED
Status: DISCONTINUED | OUTPATIENT
Start: 2024-11-01 | End: 2024-11-01 | Stop reason: SURG

## 2024-11-01 RX ORDER — PROCHLORPERAZINE EDISYLATE 5 MG/ML
5 INJECTION INTRAMUSCULAR; INTRAVENOUS EVERY 8 HOURS PRN
Status: DISCONTINUED | OUTPATIENT
Start: 2024-11-01 | End: 2024-11-01

## 2024-11-01 RX ORDER — KETOROLAC TROMETHAMINE 30 MG/ML
INJECTION, SOLUTION INTRAMUSCULAR; INTRAVENOUS AS NEEDED
Status: DISCONTINUED | OUTPATIENT
Start: 2024-11-01 | End: 2024-11-01 | Stop reason: SURG

## 2024-11-01 RX ORDER — ONDANSETRON 2 MG/ML
4 INJECTION INTRAMUSCULAR; INTRAVENOUS EVERY 6 HOURS PRN
Status: DISCONTINUED | OUTPATIENT
Start: 2024-11-01 | End: 2024-11-01

## 2024-11-01 RX ORDER — NALOXONE HYDROCHLORIDE 0.4 MG/ML
0.08 INJECTION, SOLUTION INTRAMUSCULAR; INTRAVENOUS; SUBCUTANEOUS AS NEEDED
Status: DISCONTINUED | OUTPATIENT
Start: 2024-11-01 | End: 2024-11-01

## 2024-11-01 RX ORDER — HYDROMORPHONE HYDROCHLORIDE 1 MG/ML
0.4 INJECTION, SOLUTION INTRAMUSCULAR; INTRAVENOUS; SUBCUTANEOUS EVERY 5 MIN PRN
Status: DISCONTINUED | OUTPATIENT
Start: 2024-11-01 | End: 2024-11-01

## 2024-11-01 RX ORDER — SODIUM CHLORIDE, SODIUM LACTATE, POTASSIUM CHLORIDE, CALCIUM CHLORIDE 600; 310; 30; 20 MG/100ML; MG/100ML; MG/100ML; MG/100ML
INJECTION, SOLUTION INTRAVENOUS CONTINUOUS
Status: DISCONTINUED | OUTPATIENT
Start: 2024-11-01 | End: 2024-11-01

## 2024-11-01 RX ORDER — CEPHALEXIN 500 MG/1
500 CAPSULE ORAL 3 TIMES DAILY
Qty: 30 CAPSULE | Refills: 0 | Status: SHIPPED | OUTPATIENT
Start: 2024-11-01 | End: 2024-11-11

## 2024-11-01 RX ORDER — HYDROMORPHONE HYDROCHLORIDE 1 MG/ML
0.6 INJECTION, SOLUTION INTRAMUSCULAR; INTRAVENOUS; SUBCUTANEOUS EVERY 5 MIN PRN
Status: DISCONTINUED | OUTPATIENT
Start: 2024-11-01 | End: 2024-11-01

## 2024-11-01 RX ORDER — HYDROMORPHONE HYDROCHLORIDE 1 MG/ML
0.2 INJECTION, SOLUTION INTRAMUSCULAR; INTRAVENOUS; SUBCUTANEOUS EVERY 5 MIN PRN
Status: DISCONTINUED | OUTPATIENT
Start: 2024-11-01 | End: 2024-11-01

## 2024-11-01 RX ORDER — PHENYLEPHRINE HCL 10 MG/ML
VIAL (ML) INJECTION AS NEEDED
Status: DISCONTINUED | OUTPATIENT
Start: 2024-11-01 | End: 2024-11-01 | Stop reason: SURG

## 2024-11-01 RX ORDER — LIDOCAINE HYDROCHLORIDE AND EPINEPHRINE 10; 10 MG/ML; UG/ML
INJECTION, SOLUTION INFILTRATION; PERINEURAL AS NEEDED
Status: DISCONTINUED | OUTPATIENT
Start: 2024-11-01 | End: 2024-11-01 | Stop reason: HOSPADM

## 2024-11-01 RX ORDER — HYDROCODONE BITARTRATE AND ACETAMINOPHEN 5; 325 MG/1; MG/1
1 TABLET ORAL EVERY 6 HOURS PRN
Qty: 20 TABLET | Refills: 0 | Status: SHIPPED | OUTPATIENT
Start: 2024-11-01

## 2024-11-01 RX ORDER — HYDROCODONE BITARTRATE AND ACETAMINOPHEN 5; 325 MG/1; MG/1
1 TABLET ORAL ONCE AS NEEDED
Status: DISCONTINUED | OUTPATIENT
Start: 2024-11-01 | End: 2024-11-01

## 2024-11-01 RX ORDER — SCOLOPAMINE TRANSDERMAL SYSTEM 1 MG/1
1 PATCH, EXTENDED RELEASE TRANSDERMAL ONCE
Status: DISCONTINUED | OUTPATIENT
Start: 2024-11-01 | End: 2024-11-01 | Stop reason: HOSPADM

## 2024-11-01 RX ORDER — MIDAZOLAM HYDROCHLORIDE 1 MG/ML
INJECTION INTRAMUSCULAR; INTRAVENOUS AS NEEDED
Status: DISCONTINUED | OUTPATIENT
Start: 2024-11-01 | End: 2024-11-01 | Stop reason: SURG

## 2024-11-01 RX ORDER — MEPERIDINE HYDROCHLORIDE 25 MG/ML
12.5 INJECTION INTRAMUSCULAR; INTRAVENOUS; SUBCUTANEOUS AS NEEDED
Status: DISCONTINUED | OUTPATIENT
Start: 2024-11-01 | End: 2024-11-01

## 2024-11-01 RX ORDER — HYDROCODONE BITARTRATE AND ACETAMINOPHEN 5; 325 MG/1; MG/1
2 TABLET ORAL ONCE AS NEEDED
Status: DISCONTINUED | OUTPATIENT
Start: 2024-11-01 | End: 2024-11-01

## 2024-11-01 RX ORDER — ACETAMINOPHEN 500 MG
1000 TABLET ORAL ONCE AS NEEDED
Status: DISCONTINUED | OUTPATIENT
Start: 2024-11-01 | End: 2024-11-01

## 2024-11-01 RX ADMIN — LIDOCAINE HYDROCHLORIDE 50 MG: 10 INJECTION, SOLUTION EPIDURAL; INFILTRATION; INTRACAUDAL; PERINEURAL at 10:08:00

## 2024-11-01 RX ADMIN — KETOROLAC TROMETHAMINE 15 MG: 30 INJECTION, SOLUTION INTRAMUSCULAR; INTRAVENOUS at 10:39:00

## 2024-11-01 RX ADMIN — SODIUM CHLORIDE, SODIUM LACTATE, POTASSIUM CHLORIDE, CALCIUM CHLORIDE: 600; 310; 30; 20 INJECTION, SOLUTION INTRAVENOUS at 10:58:00

## 2024-11-01 RX ADMIN — DEXAMETHASONE SODIUM PHOSPHATE 8 MG: 4 MG/ML VIAL (ML) INJECTION at 10:08:00

## 2024-11-01 RX ADMIN — PHENYLEPHRINE HCL 100 MCG: 10 MG/ML VIAL (ML) INJECTION at 10:33:00

## 2024-11-01 RX ADMIN — ESMOLOL HYDROCHLORIDE 50 MG: 10 INJECTION INTRAVENOUS at 10:08:00

## 2024-11-01 RX ADMIN — MIDAZOLAM HYDROCHLORIDE 2 MG: 1 INJECTION INTRAMUSCULAR; INTRAVENOUS at 10:05:00

## 2024-11-01 RX ADMIN — ROCURONIUM BROMIDE 60 MG: 10 INJECTION, SOLUTION INTRAVENOUS at 10:08:00

## 2024-11-01 RX ADMIN — ONDANSETRON 4 MG: 2 INJECTION INTRAMUSCULAR; INTRAVENOUS at 10:39:00

## 2024-11-01 NOTE — ANESTHESIA PREPROCEDURE EVALUATION
PRE-OP EVALUATION      Patient Name: Meryl Nicolas    Admit Diagnosis: NASAL SEPTAL DEVIATION, NASAL CONGESTION    Pre-op Diagnosis: NASAL SEPTAL DEVIATION, NASAL CONGESTION    NASAL SEPTOPLASTY    Anesthesia Procedure: NASAL SEPTOPLASTY (Bilateral)    Surgeons and Role:     * Timothy Rubio MD - Primary    Pre-op vitals reviewed.        Body mass index is 24.89 kg/m².    Current medications reviewed.  Hospital Medications:  No current facility-administered medications on file as of .       Outpatient Medications:   Prescriptions Prior to Admission[1]    Allergies: Patient has no known allergies.      Anesthesia Evaluation    Patient summary reviewed.    Anesthetic Complications           GI/Hepatic/Renal    Negative GI/hepatic/renal ROS.                             Cardiovascular    Negative cardiovascular ROS.        MET: >4                                           Endo/Other    Negative endo/other ROS.                              Pulmonary    Negative pulmonary ROS.                       Neuro/Psych    Negative neuro/psych ROS.                                  History reviewed. No pertinent surgical history.  Social History     Socioeconomic History    Marital status: Single   Tobacco Use    Smoking status: Never    Smokeless tobacco: Never   Vaping Use    Vaping status: Never Used   Substance and Sexual Activity    Alcohol use: Yes     Alcohol/week: 4.0 standard drinks of alcohol     Types: 2 Glasses of wine, 2 Cans of beer per week     Comment: 2 drinks a week.    Drug use: No    Sexual activity: Not Currently   Other Topics Concern    Caffeine Concern No    Exercise Yes     Comment: 3 times a week    Seat Belt No    Special Diet No    Stress Concern No    Weight Concern No     Service No    Hobby Hazards No    Sleep Concern No     History   Drug Use No     Available pre-op labs reviewed.  Lab Results   Component Value Date    WBC 6.6 10/26/2024    RBC 4.75 10/26/2024    HGB 13.4 10/26/2024    HCT  40.9 10/26/2024    MCV 86.1 10/26/2024    MCH 28.2 10/26/2024    MCHC 32.8 10/26/2024    RDW 12.4 10/26/2024    .0 10/26/2024     Lab Results   Component Value Date     10/26/2024    K 4.6 10/26/2024     10/26/2024    CO2 28.0 10/26/2024    BUN 7 (L) 10/26/2024    CREATSERUM 0.77 10/26/2024    GLU 91 10/26/2024    CA 10.1 10/26/2024            Airway      Mallampati: II  Mouth opening: >3 FB  TM distance: > 6 cm   Cardiovascular    Cardiovascular exam normal.         Dental    Dentition appears grossly intact         Pulmonary    Pulmonary exam normal.                 Other findings              ASA: 1   Plan: general  NPO status verified and     Post-procedure pain management plan discussed with surgeon and patient.    Comment:    I explained intrinsic risks of general anesthesia, including nausea, dental damage, sore throat, mouth injury,and hoarseness from airway management.  All questions were answered and understanding was demonstrated of risks.  Informed permission was obtained to proceed as documented in the signed consent form.      Plan/risks discussed with: patient      Consented to blood products.          Present on Admission:  **None**             [1]   No medications prior to admission.

## 2024-11-01 NOTE — H&P
History and physical by Dr. Rivas on 10/24 reviewed and up to date. No changes to H&P.    Plan is septoplasty

## 2024-11-01 NOTE — OPERATIVE REPORT
[facility]  Operative Report    Meryl Nicolas Location: OR   [facility] [id number] MRN [id number]   Admission Date 11/1/2024 Operation Date 11/1/2024   Attending Physician Timothy Rubio MD Operating Physician Timothy Rubio MD     Pre-Operative Diagnosis: NASAL SEPTAL DEVIATION, NASAL CONGESTION    Post-Operative Diagnosis: Same as above    Procedure Performed: Procedure(s):  NASAL SEPTOPLASTY    Anesthesia: General ET    EBL: 10    Specimen: Septum    Complications: None apparent    Findings: R septal deviation    INDICATIONS:  The patient is a 24 year old female with a history of nasal obstruction and congestion. Despite medical therapy, her symptoms have persisted and it was determined she may benefit from the above procedure. The risks, benefits, and alternatives of the procedure were discussed with the patient, including the risk of bleeding, infection, anesthesia, persistent or recurrent obstruction, septal perforation, and need for additional procedures. Informed consent was obtained.    DESCRIPTION OF PROCEDURE: The patient was properly identified in the preoperative area and taken back to the operating room. Anesthesia was administered via endotracheal intubation. The patient was then positioned appropriately and prepped and draped in the usual fashion. Pledgets with 1:1000 epinepherine were placed in the nasal cavities bilaterally and lidocaine 1% with 1:100,000 epinephrine was injected into the subperichondrial plane of the septum bilaterally. The pledgets were removed.  A left kathie-transfixion incision was made and the subperichondrial plane was identified.  A jacki and freer elevator were used to elevate the mucosa off the left septum. The jacki elevator was then used to make a vertical incision in the cartilage at least 1.5cm posterior to the caudal aspect. The right septal mucosa was elevated.  A swivel knife was used to remove a central piece of cartilage.  The mucosal flaps were placed back to  their original position and there were no significant deviations remaining.  The septal mucosa was intact. This resulted in significant improvement in the patient's nasal airway.  Hemostasis was achieved with afrin pledgets.       The septal incision was then closed using interrupted 4-0 chromic sutures.  A mattress suture was performed with a 4-0 plain gut suture on a lucia needle.  Carter septal splints coated in bacitracin were placed and secured anteriorly with a 0 prolene suture.  A mustache dressing was placed and the patient was allowed to awaken from general anesthetic.  The patient tolerated the procedure well and was transferred to the post-anesthesia care unit in stable condition.     Timothy Rubio MD  11/1/2024  10:57 AM

## 2024-11-01 NOTE — DISCHARGE INSTRUCTIONS
Call [facility] at [phone] or if it is after hours ask to have the doctor on call paged if your child has:    POST-OPERATIVE INSTRUCTIONS (SEPTUM SURGERY)  Following Endoscopic Sinus Surgery   The procedure generally lasts from two to three hours. You can expect to go home after the procedure unless other medical conditions complicate recovery.  You may have some silicone splints if you had septal surgery. Large packs or yards of gauze are not used except in unusual circumstances. These items are removed at your next scheduled office visit, which is generally one week following the operation. Full recovery may take a couple of weeks; however, you should be able to resume light activities within 2-3 days if not sooner.   Things to Expect  1. Bleeding from your nose is normal for 3-4 days. It may be heaviest during the first 24-48 hours and it may soak through the gauze. After this, it should slow down. If you are having heavy bleeding, are spitting up blood or feel faint, please notify us as soon as possible or call 911 if there is an emergency.  2. You may experience temporary numbness of the cheeks, upper lip, and upper teeth. This is usually temporary and should return to normal given time.  3. You may have a headache after surgery. However, if you experience severe headache associated with stiff neck, nausea, vomiting, confusion, or malaise, or anything else that worries you, call us as soon as possible or call 911.  4. Lying down flat may cause pressure to increase in your face; therefore, you may want to sleep with your head elevated  5. Dry blood, mucus and crusting in the nose will occur, and may result in cold-like or sinusitis-like symptoms. This is normal for two to three weeks after surgery. It is important to begin nasal irrigations with saline (salt water) solution starting the day of surgery.  6. You may experience a temporary loss of smell and taste; this should return when healing is  complete.  Over the Counter Medications  1. You will need to purchase a bottle of Ocean Spray or nasal saline at a local drug store.   2. Over the counter decongestant sprays such as Afrin (oxymetazoline) or Barry-Synephrine may help slow down bleeding in the first few days, but they should NOT be used beyond day 3.   THINGS TO DO  Take pain medicines as prescribed. Do not take any additional pain medications. Always follow the instructions on the medicine bottle. If the pain medication is not helping, please call us as soon as possible.  Take the antibiotics, if prescribed, according to instructions.  Take oral steroids, if prescribed, according to instructions.  Start Ocean Spray or saline irrigations on the day of surgery. You should try spray 2-3 sprays to both sides 4-6 times per day. This will keep the stents clean and help you heal faster. Sneeze or cough with your mouth open if needed  Eat a regular diet  Shower and wash as needed, but do not submerge your head in a tub or pool  Avoid air travel for 2 weeks following surgery  Take your pain medicines before your first post-operative visit  THINGS NOT TO DO  DO NOT perform any strenuous activity (gym, sports, weight lifting, sex) until your first post-operative visit and are cleared  DO NOT blow your nose or pick at your nose until cleared by your doctor  DO NOT attempt to remove any packing  DO NOT take aspirin or aspirin containing medicines, Advil, Motrin, Aleve, Excedrin, Bufferin or any other NSAIDS  DO NOT fly without your doctor’s clearance (or until 2 weeks following surgery)  Call Immediately if any of the following occur:   1. Change in vision  2. Neck stiffness or deep head pain  3. Continued nausea or vomiting  4. Bright red blood that lasts more than ten minutes or causes choking  5. Fever over 101 degrees    With any concerns or questions, or ANY bleeding, call and ask for the ENT on call physician        You have been given a prescription for  Norco 5/325  Norco was Given to you at: none given  Next dose due: at home when needed.     Take this medication as directed  This medication contains Tylenol (acetaminophen)  Do not take additional Tylenol while taking Norco    Norco is a Narcotic and can be constipating or upset your stomach  Don't take Norco on an empty stomach  Drink plenty of water  Alcoholic beverages should be avoided while taking narcotics

## 2024-11-01 NOTE — ANESTHESIA POSTPROCEDURE EVALUATION
[facility]    Meryl Nicolas Patient Status:  Hospital Outpatient Surgery   Age/Gender 24 year old female MRN [id number]   Location [facility] POST ANESTHESIA CARE UNIT Attending Timothy Rubio MD   Hosp Day # 0 PCP Tri Rivas MD       Anesthesia Post-op Note    NASAL SEPTOPLASTY    Procedure Summary       Date: 11/01/24 Room / Location:  MAIN OR 03 / [facility] MAIN OR    Anesthesia Start: 1002 Anesthesia Stop: 1058    Procedure: NASAL SEPTOPLASTY (Bilateral: Nose) Diagnosis: (NASAL SEPTAL DEVIATION, NASAL CONGESTION)    Surgeons: Timothy Rubio MD Anesthesiologist: Anatoly Banegas DO    Anesthesia Type: general ASA Status: 1            Anesthesia Type: general    Vitals Value Taken Time   /78 11/01/24 1058   Temp 97.5 11/01/24 1058   Pulse 94 11/01/24 1058   Resp 16 11/01/24 1058   SpO2 100 % 11/01/24 1058   Vitals shown include unfiled device data.    Patient Location: PACU    Anesthesia Type: general    Airway Patency: patent    Postop Pain Control: adequate    Mental Status: mildly sedated but able to meaningfully participate in the post-anesthesia evaluation    Nausea/Vomiting: none    Cardiopulmonary/Hydration status: stable euvolemic    Complications: no apparent anesthesia related complications    Postop vital signs: stable    Dental Exam: Unchanged from Preop    Patient to be discharged from PACU when criteria met.

## 2024-11-01 NOTE — ANESTHESIA PROCEDURE NOTES
Airway  Date/Time: 11/1/2024 10:15 AM  Urgency: elective      General Information and Staff    Patient location during procedure: OR  Anesthesiologist: Anatoly Banegas DO  Performed: anesthesiologist   Performed by: Anatoly Banegas DO  Authorized by: Anatoly Banegas DO      Indications and Patient Condition  Indications for airway management: anesthesia  Sedation level: deep  Preoxygenated: yes  Patient position: sniffing  Mask difficulty assessment: 1 - vent by mask    Final Airway Details  Final airway type: endotracheal airway      Successful airway: ETT  Cuffed: yes   Successful intubation technique: Video laryngoscopy  Endotracheal tube insertion site: oral  Blade: GlideScope  Blade size: #3  ETT size (mm): 7.0    Placement verified by: capnometry   Measured from: lips  ETT to lips (cm): 22  Number of attempts at approach: 1    Additional Comments  Small amount of bleeding noted from lower front tooth prior to any airway instrumentation.  Oral mucosa remains in preoperative state following airway instrumentation.

## 2024-11-04 ENCOUNTER — MED REC SCAN ONLY (OUTPATIENT)
Dept: FAMILY MEDICINE CLINIC | Facility: CLINIC | Age: 24
End: 2024-11-04

## 2024-11-05 ENCOUNTER — OFFICE VISIT (OUTPATIENT)
Dept: FAMILY MEDICINE CLINIC | Facility: CLINIC | Age: 24
End: 2024-11-05
Payer: COMMERCIAL

## 2024-11-05 VITALS
TEMPERATURE: 98 F | WEIGHT: 158 LBS | BODY MASS INDEX: 27 KG/M2 | DIASTOLIC BLOOD PRESSURE: 82 MMHG | HEART RATE: 100 BPM | SYSTOLIC BLOOD PRESSURE: 108 MMHG

## 2024-11-05 DIAGNOSIS — R07.9 CHEST PAIN, UNSPECIFIED TYPE: Primary | ICD-10-CM

## 2024-11-05 NOTE — PROGRESS NOTES
CHIEF COMPLAINT:     Chief Complaint   Patient presents with    Breathing Problem     X yesterday having hard time breathing, unsure if related to sx that she had on Friday, chest pain, \"takes a lot out of her\" to breathe, feeling exhausted today   Hx of shingles, rash R side, radiating towards front, noticed on Wed, itchy, OTC Cortisone        HPI:   Meryl Nicolas is a 24 year old female who presents with complaints of CP and SOB since having her septoplasty 4 days ago.  The patient reports constant pain that is substernal, worse with taking a deep breath and improved with sitting still.  The patient reports associated SOB and fatigue.  The patient denies fever, nasal congestion, nasal drainage, and cough.  The patient denies OCP use, recent travel, pain/swelling in the legs, and history of PE/DVT.  The patient also reports rash to the right flank for 6 days that is itchy.      Current Outpatient Medications   Medication Sig Dispense Refill    HYDROcodone-acetaminophen (NORCO) 5-325 MG Oral Tab Take 1 tablet by mouth every 6 (six) hours as needed. 20 tablet 0    cephALEXin 500 MG Oral Cap Take 1 capsule (500 mg total) by mouth 3 (three) times daily for 10 days. 30 capsule 0    traMADol HCl (ULTRAM) 50 MG Oral Tab Take 1 tablet (50 mg total) by mouth 2 (two) times daily as needed for Pain. For 30 days 60 tablet 2    gabapentin (NEURONTIN) 300 MG Oral Cap Take 1 capsule (300 mg total) by mouth 3 (three) times daily. for 30 days (Patient taking differently: Take 1 capsule (300 mg total) by mouth in the morning and 1 capsule (300 mg total) before bedtime. for 30 days.) 90 capsule 2    celecoxib (CELEBREX) 100 MG Oral Cap Take 1 capsule (100 mg total) by mouth 2 (two) times daily. 60 capsule 2    ibuprofen (MOTRIN) 200 MG Oral Tab Take 1 tablet (200 mg total) by mouth every 6 (six) hours as needed for Pain.      Fexofenadine-Pseudoephedrine (ALLEGRA-D OR) Take  by mouth.        Past Medical History:    Allergic  rhinitis    Anxiety    Arthritis    Hx of motion sickness      Social History:  Social History     Socioeconomic History    Marital status: Single   Tobacco Use    Smoking status: Never    Smokeless tobacco: Never   Vaping Use    Vaping status: Never Used   Substance and Sexual Activity    Alcohol use: Yes     Alcohol/week: 4.0 standard drinks of alcohol     Types: 2 Glasses of wine, 2 Cans of beer per week     Comment: 2 drinks a week.    Drug use: No    Sexual activity: Not Currently   Other Topics Concern    Caffeine Concern No    Exercise Yes     Comment: 3 times a week    Seat Belt No    Special Diet No    Stress Concern No    Weight Concern No     Service No    Hobby Hazards No    Sleep Concern No        REVIEW OF SYSTEMS:   GENERAL: Denies fever, chills,weight change, decreased appetite  SKIN: Denies rashes, skin wounds or ulcers.  EYES: Denies blurred vision or double vision  HENT: Denies congestion, rhinorrhea, sore throat or ear pain  CHEST: See HPI  LUNGS: Denies shortness of breath, cough, or wheezing  GI: Denies abdominal pain, N/V/C/D.   MUSCULOSKELETAL: no arthralgia or swollen joints  LYMPH:  Denies lymphadenopathy  NEURO: Denies headaches or lightheadedness      EXAM:   /82   Pulse 100   Temp 98.1 °F (36.7 °C)   Wt 158 lb (71.7 kg)   LMP 10/11/2024 (Approximate)   BMI 27.12 kg/m²   GENERAL: well developed, well nourished,in no apparent distress, cooperative   SKIN: Scabbed rash to the right chest  HEAD: atraumatic, normocephalic  EYES: EOM intact, PERRL.  Conjunctiva normal.  Cornea clear.  Lid margins normal.  No active drainage.  EARS: Right TM normal, no bulging, no retraction, no fluid, bony landmarks normal.  Left TM normal, no bulging, no retraction, no fluid, bony landmarks normal.    NOSE: nostrils patent, + discharge, nasal mucosa pink and with mild inflammation.  No sinus tenderness.  THROAT: oral mucosa pink, moist and intact. No visible dental caries. Posterior pharynx  without erythema or exudates.  NECK: supple, non-tender.  LUNGS: clear to auscultation bilaterally, no wheezes or rhonchi. Breathing is non labored.  CARDIO: RRR without murmur  GI: No visible scars, or masses. BS's present x4. No palpable masses or hepatosplenomegaly.  Non tender.  No guarding or rebound tenderness  EXTREMITIES: no cyanosis, clubbing or edema.  Homans NEG.  Dorsalis Pedis 2+.  LYMPH:  No lymphadenopathy.    NEURO: A&Ox3.  CN II-XII intact.  No focal deficits.  Coordination and Gait normal.  Kernig and Brudzinski's are negative.    Discussed the limitations of the walk-in clinic with the patient in regards to her chest pain and shortness of breath.  Advised evaluation at a higher level of care.      ASSESSMENT AND PLAN:     ASSESSMENT:  Encounter Diagnosis   Name Primary?    Chest pain, unspecified type Yes       PLAN:    Patient Instructions   To [place] [facility] now for further treatment and evaluation

## 2025-02-24 ENCOUNTER — OFFICE VISIT (OUTPATIENT)
Dept: FAMILY MEDICINE CLINIC | Facility: CLINIC | Age: 25
End: 2025-02-24
Payer: COMMERCIAL

## 2025-02-24 VITALS
HEIGHT: 64 IN | SYSTOLIC BLOOD PRESSURE: 104 MMHG | BODY MASS INDEX: 27.14 KG/M2 | OXYGEN SATURATION: 95 % | WEIGHT: 159 LBS | DIASTOLIC BLOOD PRESSURE: 84 MMHG | RESPIRATION RATE: 15 BRPM | TEMPERATURE: 98 F | HEART RATE: 74 BPM

## 2025-02-24 DIAGNOSIS — J01.00 ACUTE NON-RECURRENT MAXILLARY SINUSITIS: Primary | ICD-10-CM

## 2025-02-24 PROCEDURE — 3074F SYST BP LT 130 MM HG: CPT | Performed by: NURSE PRACTITIONER

## 2025-02-24 PROCEDURE — 99213 OFFICE O/P EST LOW 20 MIN: CPT | Performed by: NURSE PRACTITIONER

## 2025-02-24 PROCEDURE — 3079F DIAST BP 80-89 MM HG: CPT | Performed by: NURSE PRACTITIONER

## 2025-02-24 PROCEDURE — 3008F BODY MASS INDEX DOCD: CPT | Performed by: NURSE PRACTITIONER

## 2025-02-24 RX ORDER — AMOXICILLIN 875 MG/1
875 TABLET, COATED ORAL 2 TIMES DAILY
Qty: 14 TABLET | Refills: 0 | Status: SHIPPED | OUTPATIENT
Start: 2025-02-24 | End: 2025-03-03

## 2025-02-24 NOTE — PROGRESS NOTES
CHIEF COMPLAINT:     Chief Complaint   Patient presents with    Sore Throat     Head, ear, body hurts, coughing and sore throat for 7 days        HPI:   Meryl Nicolas is a 25 year old female who presents for upper respiratory symptoms for  1 weeks. Patient reports head congestion, sinus pressure, headache, ear pain, sore throat, green rhinitis, PND, cough. Symptoms have been worsening since onset.  Treating symptoms with sudafed.      Current Outpatient Medications   Medication Sig Dispense Refill    amoxicillin 875 MG Oral Tab Take 1 tablet (875 mg total) by mouth 2 (two) times daily for 7 days. 14 tablet 0    traMADol HCl (ULTRAM) 50 MG Oral Tab Take 1 tablet (50 mg total) by mouth 2 (two) times daily as needed for Pain. For 30 days 60 tablet 2    gabapentin (NEURONTIN) 300 MG Oral Cap Take 1 capsule (300 mg total) by mouth 3 (three) times daily. for 30 days (Patient taking differently: Take 1 capsule (300 mg total) by mouth in the morning and 1 capsule (300 mg total) before bedtime. for 30 days.) 90 capsule 2    celecoxib (CELEBREX) 100 MG Oral Cap Take 1 capsule (100 mg total) by mouth 2 (two) times daily. 60 capsule 2    Fexofenadine-Pseudoephedrine (ALLEGRA-D OR) Take  by mouth.      HYDROcodone-acetaminophen (NORCO) 5-325 MG Oral Tab Take 1 tablet by mouth every 6 (six) hours as needed. (Patient not taking: Reported on 2/24/2025) 20 tablet 0    ibuprofen (MOTRIN) 200 MG Oral Tab Take 1 tablet (200 mg total) by mouth every 6 (six) hours as needed for Pain. (Patient not taking: Reported on 2/24/2025)        Past Medical History:    Allergic rhinitis    Anxiety    Arthritis    Hx of motion sickness      History reviewed. No pertinent surgical history.      Social History     Socioeconomic History    Marital status: Single   Tobacco Use    Smoking status: Never    Smokeless tobacco: Never   Vaping Use    Vaping status: Never Used   Substance and Sexual Activity    Alcohol use: Yes     Alcohol/week: 4.0 standard  drinks of alcohol     Types: 2 Glasses of wine, 2 Cans of beer per week     Comment: 2 drinks a week.    Drug use: No    Sexual activity: Not Currently   Other Topics Concern    Caffeine Concern No    Exercise Yes     Comment: 3 times a week    Seat Belt No    Special Diet No    Stress Concern No    Weight Concern No     Service No    Hobby Hazards No    Sleep Concern No         REVIEW OF SYSTEMS:   GENERAL: decreased appetite  SKIN: no rashes or abnormal skin lesions  HEENT: See HPI  LUNGS: See HPI  CARDIOVASCULAR: denies chest pain or palpitations   GI: denies N/V/C or abdominal pain      EXAM:   /84 (BP Location: Left arm, Patient Position: Sitting, Cuff Size: adult)   Pulse 74   Temp 98.3 °F (36.8 °C) (Oral)   Resp 15   Ht 5' 4\" (1.626 m)   Wt 159 lb (72.1 kg)   LMP 10/01/2024 (Approximate)   SpO2 95%   BMI 27.29 kg/m²   GENERAL: well developed, well nourished,in no apparent distress  SKIN: no rashes,  HEAD: atraumatic, normocephalic.  + tenderness on palpation of maxillary sinuses  EYES: conjunctiva clear, EARS: TM's grey, no bulging, + retraction on right, + fluid on left, bony landmarks obscured  NOSE: Nostrils patent, green nasal discharge, nasal mucosa + erythema   THROAT: Oral mucosa pink, moist. Posterior pharynx is not erythematous. no exudates. .    NECK: Supple, non-tender  LUNGS: clear to auscultation bilaterally, no wheezes or rhonchi. Breathing is non labored.  CARDIO: RRR without murmur  EXTREMITIES: no cyanosis, clubbing or edema  LYMPH:  no cervical lymphadenopathy.    PSYCH:pleasant mood and affect  NEURO: no focal deficits      ASSESSMENT AND PLAN:   Meryl Nicolas is a 25 year old female who presents with upper respiratory symptoms that are consistent with    ASSESSMENT:   Encounter Diagnosis   Name Primary?    Acute non-recurrent maxillary sinusitis Yes       PLAN: Meds as below.  Comfort care as described in Patient Instructions    Meds & Refills for this  Visit:  Requested Prescriptions     Signed Prescriptions Disp Refills    amoxicillin 875 MG Oral Tab 14 tablet 0     Sig: Take 1 tablet (875 mg total) by mouth 2 (two) times daily for 7 days.     Risks, benefits, and side effects of medication explained and discussed.  The patient indicates understanding of these issues and agrees to the plan.  The patient is asked to f/u with PCP if sx's persist or worsen.  There are no Patient Instructions on file for this visit.

## 2025-06-10 ENCOUNTER — HOSPITAL ENCOUNTER (EMERGENCY)
Age: 25
Discharge: HOME | End: 2025-06-10
Payer: COMMERCIAL

## 2025-06-10 VITALS
HEIGHT: 64 IN | BODY MASS INDEX: 23.9 KG/M2 | SYSTOLIC BLOOD PRESSURE: 119 MMHG | TEMPERATURE: 99 F | DIASTOLIC BLOOD PRESSURE: 74 MMHG | HEART RATE: 103 BPM | WEIGHT: 140 LBS | RESPIRATION RATE: 16 BRPM | OXYGEN SATURATION: 99 %

## 2025-06-10 DIAGNOSIS — S80.02XA CONTUSION OF LEFT KNEE, INITIAL ENCOUNTER: ICD-10-CM
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
## 2025-06-10 PROCEDURE — 99283 EMERGENCY DEPT VISIT LOW MDM: CPT

## 2025-06-10 PROCEDURE — 96372 THER/PROPH/DIAG INJ SC/IM: CPT

## 2025-06-10 RX ORDER — KETOROLAC TROMETHAMINE 30 MG/ML
30 INJECTION, SOLUTION INTRAMUSCULAR; INTRAVENOUS ONCE
Status: COMPLETED | OUTPATIENT
Start: 2025-06-10 | End: 2025-06-10

## 2025-06-10 RX ORDER — METHOCARBAMOL 750 MG/1
750 TABLET, FILM COATED ORAL NIGHTLY
Qty: 14 TABLET | Refills: 0 | Status: SHIPPED | OUTPATIENT
Start: 2025-06-10

## 2025-06-10 RX ORDER — KETOROLAC TROMETHAMINE 10 MG/1
10 TABLET, FILM COATED ORAL 3 TIMES DAILY
Qty: 15 TABLET | Refills: 0 | Status: SHIPPED | OUTPATIENT
Start: 2025-06-10 | End: 2025-06-15

## 2025-06-11 NOTE — ED QUICK NOTES
At bedside to collect urine sample, patient unable to urinate at this time. States she is confident she is not pregnant, would like to decline pregnancy test. Discussed with Peña BRUNO. Ok to medicate.
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
## 2025-06-11 NOTE — DISCHARGE INSTRUCTIONS
Toradol 3 times a day with meals.  Do not take additional NSAIDs such as ibuprofen with this medication.  Muscle relaxant night.  Push clear fluids.  Ice all areas.
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
## (undated) DEVICE — SPLINT 1524050 5PK PAIR DOYLE II AIRWAY: Brand: DOYLE II ™

## (undated) DEVICE — SLEEVE COMPR MD KNEE LEN SGL USE KENDALL SCD

## (undated) DEVICE — SYRINGE MED 10ML LL CTRL W/ FNGR GRP CLR BRL

## (undated) DEVICE — ZZ-DISC-SUB-422196-SUT CHRM GUT 4-0 18IN P-3 ABSRB UD 13MM 3/8

## (undated) DEVICE — SOLUTION IRRIG 1000ML 0.9% NACL USP BTL

## (undated) DEVICE — GLOVE SUR 7.5 SENSICARE PI PIP CRM PWD F

## (undated) DEVICE — SUT PLN GUT 4-0 18IN SC-1 ABSRB TAN YELLOWISH

## (undated) DEVICE — PACK CDS SINUS

## (undated) DEVICE — KIT,ANTI FOG,W/SPONGE & FLUID,SOFT PACK: Brand: MEDLINE

## (undated) DEVICE — DALE NASAL DRESSING HOLDER, FITS MOST: Brand: DALE NASAL DRESSING HOLDER

## (undated) DEVICE — SUT PROL 2-0 30IN CT-2 NABSRB BLU L26MM 1/2 C

## (undated) NOTE — LETTER
Date: 11/5/2024    Patient Name: Meryl Nicolas          To Whom it may concern:    This letter has been written at the patient's request. The above patient was seen at [facility] for treatment of a medical condition.    This patient should be excused from attending work/school today due to medical reasons.         Sincerely,    Emily Katz PA-C

## (undated) NOTE — LETTER
Date & Time: 6/10/2025, 8:06 PM  Patient: Meryl Nicolas  Encounter Provider(s):    Malu Nunes PA-C       To Whom It May Concern:    Meryl Nicolas was seen and treated in our department on 6/10/2025. She should not return to work until 6/14/25.    If you have any questions or concerns, please do not hesitate to call.        _____________________________  Physician/APC Signature

## (undated) NOTE — ED AVS SNAPSHOT
Madison Arnett   MRN: [id number]    Department:  THE [facility] Emergency Department in [place]   Date of Visit:  1/22/2018           Disclosure     Insurance plans vary and the physician(s) referred by the ER may not be covered by your plan.  Please contac tell this physician (or your personal doctor if your instructions are to return to your personal doctor) about any new or lasting problems. The primary care or specialist physician will see patients referred from the [facility] Emergency Department.  Denae Redd

## (undated) NOTE — ED AVS SNAPSHOT
THE [facility] Emergency Department in [address]    Phone:  [phone]    Fax:  [phone] [facility]   MRN: [id number]    Department:  THE [facility] Emergency Department in [place]   Date of Visit: IF THERE IS ANY CHANGE OR WORSENING OF YOUR CONDITION, CALL YOUR PRIMARY CARE PHYSICIAN AT ONCE OR RETURN IMMEDIATELY TO THE EMERGENCY DEPARTMENT.     If you have been prescribed any medication(s), please fill your prescription right away and begin taking t

## (undated) NOTE — LETTER
Date: 2/24/2025    Patient Name: Meryl Nicolas          To Whom it may concern:    This letter has been written at the patient's request. The above patient was seen at [facility] for treatment of a medical condition.    This patient should be excused from attending work/school from 2/24/25 through 2/25/25.    The patient may return to work/school on 2/26/25.         Sincerely,    SHERRY Lacey

## (undated) NOTE — ED AVS SNAPSHOT
THE [facility] Emergency Department in [address] [facility]    Phone:  [phone]    Fax:  [phone] [facility]   MRN: [id number]    Department:  THE [facility] Emergency Department in [place]   Date of Visit: If you have any problems with your follow-up, please call our  at [phone]    Si usted tiene algun problema con bojorquez sequimiento, por favor llame a nuestro adminstrador de casos al [phone]    Expect to receive an electronic request Coby Cardozo [address] [facility] [place] ([address]) [address] [facility]. ([address]) [address]  ([address]) [address] [facility] PROCEDURE:  XR FOREARM (2 VIEWS), LEFT (CPT=73090)     TECHNIQUE:  Two views were obtained. COMPARISON:  None. INDICATIONS:  fall friday, left wrist pain     PATIENT STATED HISTORY:  Patient slipped and fell on ice on Friday.   She landed on her le